# Patient Record
Sex: FEMALE | Race: WHITE | NOT HISPANIC OR LATINO | Employment: UNEMPLOYED | URBAN - NONMETROPOLITAN AREA
[De-identification: names, ages, dates, MRNs, and addresses within clinical notes are randomized per-mention and may not be internally consistent; named-entity substitution may affect disease eponyms.]

---

## 2020-10-27 ENCOUNTER — CONSULT (OUTPATIENT)
Dept: ONCOLOGY | Facility: CLINIC | Age: 45
End: 2020-10-27

## 2020-10-27 VITALS
HEART RATE: 69 BPM | DIASTOLIC BLOOD PRESSURE: 79 MMHG | SYSTOLIC BLOOD PRESSURE: 139 MMHG | RESPIRATION RATE: 16 BRPM | HEIGHT: 55 IN | WEIGHT: 164 LBS | BODY MASS INDEX: 37.95 KG/M2 | TEMPERATURE: 98.9 F

## 2020-10-27 DIAGNOSIS — D58.8: Primary | ICD-10-CM

## 2020-10-27 PROCEDURE — 99203 OFFICE O/P NEW LOW 30 MIN: CPT | Performed by: INTERNAL MEDICINE

## 2020-10-27 RX ORDER — ECHINACEA PURPUREA AERIAL 350 MG
CAPSULE ORAL
COMMUNITY

## 2020-10-27 RX ORDER — UREA 10 %
800 LOTION (ML) TOPICAL DAILY
COMMUNITY

## 2020-10-27 RX ORDER — PHENOL 1.4 %
600 AEROSOL, SPRAY (ML) MUCOUS MEMBRANE DAILY
COMMUNITY

## 2020-10-27 RX ORDER — CHLORAL HYDRATE 500 MG
CAPSULE ORAL
COMMUNITY

## 2020-10-27 RX ORDER — SIMVASTATIN 10 MG
TABLET ORAL
COMMUNITY
Start: 2020-10-21

## 2020-10-27 NOTE — PROGRESS NOTES
CHIEF COMPLAINT: Elevated bilirubin    REASON FOR REFERRAL: Elevated bilirubin      RECORDS OBTAINED  Records of the patients history including those obtained from primary care were reviewed and summarized in detail.    Oncology/Hematology History Overview Note   1.  History of compensated chronic hemolytic anemia with family history of frequent gallstones highly suggestive of hereditary severe psychosis.  2.  Hyperbilirubinemia due to problem #1 with elevated AST and ALT September 2020    -10/27/2020 [facility] hematology consultation: She was seen previously in our office 8/29/2012 for compensated hemolytic anemia in 2003 where she was found to be Juany negative with decreased haptoglobin and a negative work-up for PNH.  Normal hemoglobin electrophoresis.  Negative urine hemosiderin.  Normal pyruvate kinase.  Normal G6PD.  Soluble hemoglobin test negative.  Juany repeatedly negative.  Normal peripheral smear.  Bilirubin generally 1.5-2 without jaundice.  She was managed conservatively given normal counts with simply folate and B12 supplementation with multivitamin.  She was last seen by me in August 2013 for chronic compensated hemolysis with hemoglobin 13.6.  She saw my nurse practitioner August 2014 for same hemoglobin.  She continued her on B12 with multivitamin.  Saw nurse practitioner in [place].  May 2019 hemoglobin 13.8.  With bilirubin 2.9.  September 2020 hemoglobin 14.5 Creatinine normal at 0.47.  Repeat bilirubin 1.7.  Referred back to us because of this chronic elevated bilirubin.  As I have stated on prior consultations, she has likely hyperbilirubinemia due to compensated Juany negative hemolytic anemia and I suspect has hereditary spherocytosis but proving such would not change her management as there is no specific therapy necessary other than supplementation folic acid and B12 and we will have her follow-up with primary care.  Modest elevation of bilirubin is irrelevant.  She could have Gilbert's  as a source for bilirubin as well but again there is no particular intervention necessary nor particularly with follow-up with hematology.  He was concerned with her September 2020 AST in the 70s and ALT in the 50s and wondered how before going on lipid-lowering agents, whether her elevated bilirubin/compensated hemolytic anemia/Gilbert's had a deleterious impact on her use of lipid-lowering agents. I told her that I did not think she had any significant hematological disorder that would interfere with this and if she does have decompensated hemolytic process that I have discussed with her several times since our first visit in 2003, that should not cause the elevation in her AST and ALT.  I suspect she may have fatty liver disease or other more common causes of that.  She does not drink.  She does not take Tylenol.  I asked her to check her primary care to get to gastroenterology relative to her liver enzymes but I do not plan for chronic mild hyperbilirubinemia that is either a compensated hemolysis and/or Gilbert's has any particular direct impact on her treatment of hyperlipidemia more likely any direct impact on her AST and ALT.  We will see her on an as-needed basis.     Acute familial hemolysis syndrome (CMS/HCC)   10/27/2020 Initial Diagnosis    Acute familial hemolysis syndrome (CMS/HCC)         HISTORY OF PRESENT ILLNESS:  The patient is a 45 y.o.  female, referred for elevated bilirubin.  See above for hematology history.    REVIEW OF SYSTEMS:  A 14 point review of systems was performed and is negative except as noted above.    Past Medical History:   Diagnosis Date   • Bladder problem      Past Surgical History:   Procedure Laterality Date   • BLADDER SLING MODIFIED, ANTERIOR AND POSTERIOR VAGINAL REPAIR  07/2018    [facility] -dr. samanta tamayo   • BRAIN TUMOR EXCISION  03/2000    [facility] - dr. mady figueredo       Current Outpatient Medications on File Prior to Visit   Medication Sig Dispense  "Refill   • calcium carbonate (OS-EZRA) 600 MG tablet Take 600 mg by mouth Daily.     • Cholecalciferol (vitamin D3) 125 MCG (5000 UT) capsule capsule Take 5,000 Units by mouth Daily.     • folic acid (FOLVITE) 800 MCG tablet Take 800 mcg by mouth Daily.     • Milk Thistle 140 MG capsule Take  by mouth.     • Omega-3 1000 MG capsule Take  by mouth.     • Omega-3 Fatty Acids (Fish Oil) 1200 MG capsule delayed-release Take  by mouth.     • simvastatin (ZOCOR) 10 MG tablet        No current facility-administered medications on file prior to visit.        No Known Allergies    Social History     Socioeconomic History   • Marital status:      Spouse name: Not on file   • Number of children: Not on file   • Years of education: Not on file   • Highest education level: Not on file   Tobacco Use   • Smoking status: Never Smoker   Substance and Sexual Activity   • Alcohol use: Never     Frequency: Never   • Drug use: Never       Family History   Problem Relation Age of Onset   • Diabetes Mother    • Thyroid cancer Sister    • Cancer Brother        PHYSICAL EXAM:    /79   Pulse 69   Temp 98.9 °F (37.2 °C)   Resp 16   Ht 63.5 cm (25\")   Wt 74.4 kg (164 lb)   .49 kg/m²     ECOG: (0) Fully Active - Able to Carry On All Pre-disease Performance Without Restriction  General: well appearing female in no acute distress  HEENT: sclera anicteric, oropharynx clear  Lymphatics: no cervical, supraclavicular, inguinal, or axillary adenopathy  Cardiovascular: regular rate and rhythm, no murmurs  Neck: Supple; No thyromegaly  Lungs: clear to auscultation bilaterally. No respiratory distress.   Abdomen: soft, nontender, nondistended.  No palpable organomegaly  Extremities: no cyanosis, clubbing, edema, or cords  Skin: no rashes, lesions, bruising, or petechiae  Neuro: Alert and oriented x 4; Moving all extremities.  Psych: No anxiety or depression    Lab Results   Component Value Date    HGB 13.6 08/26/2014    HCT 40.8 " 08/26/2014    MCV 89.4 08/26/2014     08/26/2014    WBC 7.9 08/26/2014    NEUTROABS 5.0 08/26/2014    LYMPHSABS 2.6 08/26/2014    MONOSABS 0.3 08/26/2014     No results found for: GLUCOSE, BUN, CREATININE, NA, K, CL, CO2, CALCIUM, PROTEINTOT, ALBUMIN, BILITOT, ALKPHOS, AST, ALT        Assessment/Plan   1.  History of compensated chronic hemolytic anemia with family history of frequent gallstones highly suggestive of hereditary severe psychosis.  2.  Hyperbilirubinemia due to problem #1 with elevated AST and ALT September 2020  3.  History of brain tumor removal 2000 Dr. Alva    -10/27/2020 [facility] hematology consultation: She was seen previously in our office 8/29/2012 for compensated hemolytic anemia in 2003 where she was found to be Juany negative with decreased haptoglobin and a negative work-up for PNH.  Normal hemoglobin electrophoresis.  Negative urine hemosiderin.  Normal pyruvate kinase.  Normal G6PD.  Soluble hemoglobin test negative.  Juany repeatedly negative.  Normal peripheral smear.  Bilirubin generally 1.5-2 without jaundice.  She was managed conservatively given normal counts with simply folate and B12 supplementation with multivitamin.  She was last seen by me in August 2013 for chronic compensated hemolysis with hemoglobin 13.6.  She saw my nurse practitioner August 2014 for same hemoglobin.  She continued her on B12 with multivitamin.  Saw nurse practitioner in [place].  May 2019 hemoglobin 13.8.  With bilirubin 2.9.  September 2020 hemoglobin 14.5 Creatinine normal at 0.47.  Repeat bilirubin 1.7.  Referred back to us because of this chronic elevated bilirubin.  As I have stated on prior consultations, she has likely hyperbilirubinemia due to compensated Juany negative hemolytic anemia and I suspect has hereditary spherocytosis but proving such would not change her management as there is no specific therapy necessary other than supplementation folic acid and B12 and we will have her  follow-up with primary care.  Modest elevation of bilirubin is irrelevant.  She could have Gilbert's as a source for bilirubin as well but again there is no particular intervention necessary nor particularly with follow-up with hematology.  He was concerned with her September 2020 AST in the 70s and ALT in the 50s and wondered how before going on lipid-lowering agents, whether her elevated bilirubin/compensated hemolytic anemia/Gilbert's had a deleterious impact on her use of lipid-lowering agents. I told her that I did not think she had any significant hematological disorder that would interfere with this and if she does have decompensated hemolytic process that I have discussed with her several times since our first visit in 2003, that should not cause the elevation in her AST and ALT.  I suspect she may have fatty liver disease or other more common causes of that.  She does not drink.  She does not take Tylenol.  I asked her to check her primary care to get to gastroenterology relative to her liver enzymes but I do not plan for chronic mild hyperbilirubinemia that is either a compensated hemolysis and/or Gilbert's has any particular direct impact on her treatment of hyperlipidemia more likely any direct impact on her AST and ALT.  We will see her on an as-needed basis    Discussed with patient face-to-face 30 minutes greater than 50% spent counseling regarding this process as outlined above    Alberto Saavedra MD    10/27/2020

## 2024-02-04 PROBLEM — Z01.419 WELL WOMAN EXAM: Status: ACTIVE | Noted: 2024-02-04

## 2024-02-05 ENCOUNTER — TELEPHONE (OUTPATIENT)
Dept: OBSTETRICS AND GYNECOLOGY | Facility: CLINIC | Age: 49
End: 2024-02-05

## 2024-02-05 NOTE — TELEPHONE ENCOUNTER
Caller: Arelis Matias    Relationship to patient: Self    Best call back number: [phone]        Type of visit: NEW GYN         If rescheduling, when is the original appointment: 2/5/24     Additional notes:PT CANCELLED TODAYS APPT DUE TO BEING SICK SHE WILL CALL BACK TO R/S